# Patient Record
Sex: MALE | Race: WHITE
[De-identification: names, ages, dates, MRNs, and addresses within clinical notes are randomized per-mention and may not be internally consistent; named-entity substitution may affect disease eponyms.]

---

## 2019-01-15 ENCOUNTER — HOSPITAL ENCOUNTER (OUTPATIENT)
Dept: HOSPITAL 61 - SURG | Age: 84
Discharge: HOME | End: 2019-01-15
Attending: SURGERY
Payer: MEDICARE

## 2019-01-15 VITALS — DIASTOLIC BLOOD PRESSURE: 74 MMHG | SYSTOLIC BLOOD PRESSURE: 130 MMHG

## 2019-01-15 VITALS — HEIGHT: 71 IN | BODY MASS INDEX: 18.9 KG/M2 | WEIGHT: 135 LBS

## 2019-01-15 DIAGNOSIS — Z87.891: ICD-10-CM

## 2019-01-15 DIAGNOSIS — Z98.890: ICD-10-CM

## 2019-01-15 DIAGNOSIS — Z79.899: ICD-10-CM

## 2019-01-15 DIAGNOSIS — K21.9: ICD-10-CM

## 2019-01-15 DIAGNOSIS — F03.90: ICD-10-CM

## 2019-01-15 DIAGNOSIS — F41.9: ICD-10-CM

## 2019-01-15 DIAGNOSIS — K40.30: Primary | ICD-10-CM

## 2019-01-15 LAB
ALBUMIN SERPL-MCNC: 3.2 G/DL (ref 3.4–5)
ANION GAP SERPL CALC-SCNC: 10 MMOL/L (ref 6–14)
BASOPHILS # BLD AUTO: 0 X10^3/UL (ref 0–0.2)
BASOPHILS NFR BLD: 1 % (ref 0–3)
BUN SERPL-MCNC: 16 MG/DL (ref 8–26)
CALCIUM SERPL-MCNC: 9.4 MG/DL (ref 8.5–10.1)
CHLORIDE SERPL-SCNC: 102 MMOL/L (ref 98–107)
CO2 SERPL-SCNC: 25 MMOL/L (ref 21–32)
CREAT SERPL-MCNC: 1.5 MG/DL (ref 0.7–1.3)
EOSINOPHIL NFR BLD: 0.3 X10^3/UL (ref 0–0.7)
EOSINOPHIL NFR BLD: 8 % (ref 0–3)
ERYTHROCYTE [DISTWIDTH] IN BLOOD BY AUTOMATED COUNT: 12.9 % (ref 11.5–14.5)
GFR SERPLBLD BASED ON 1.73 SQ M-ARVRAT: 44.2 ML/MIN
GLUCOSE SERPL-MCNC: 104 MG/DL (ref 70–99)
HCT VFR BLD CALC: 40.3 % (ref 39–53)
HGB BLD-MCNC: 14.2 G/DL (ref 13–17.5)
LYMPHOCYTES # BLD: 1 X10^3/UL (ref 1–4.8)
LYMPHOCYTES NFR BLD AUTO: 21 % (ref 24–48)
MCH RBC QN AUTO: 34 PG (ref 25–35)
MCHC RBC AUTO-ENTMCNC: 35 G/DL (ref 31–37)
MCV RBC AUTO: 97 FL (ref 79–100)
MONO #: 0.5 X10^3/UL (ref 0–1.1)
MONOCYTES NFR BLD: 11 % (ref 0–9)
NEUT #: 2.8 X10^3UL (ref 1.8–7.7)
NEUTROPHILS NFR BLD AUTO: 60 % (ref 31–73)
PLATELET # BLD AUTO: 436 X10^3/UL (ref 140–400)
POTASSIUM SERPL-SCNC: 4 MMOL/L (ref 3.5–5.1)
RBC # BLD AUTO: 4.15 X10^6/UL (ref 4.3–5.7)
SODIUM SERPL-SCNC: 137 MMOL/L (ref 136–145)
WBC # BLD AUTO: 4.7 X10^3/UL (ref 4–11)

## 2019-01-15 PROCEDURE — 88307 TISSUE EXAM BY PATHOLOGIST: CPT

## 2019-01-15 PROCEDURE — 88302 TISSUE EXAM BY PATHOLOGIST: CPT

## 2019-01-15 PROCEDURE — 49507 PRP I/HERN INIT BLOCK >5 YR: CPT

## 2019-01-15 PROCEDURE — 80048 BASIC METABOLIC PNL TOTAL CA: CPT

## 2019-01-15 PROCEDURE — 36415 COLL VENOUS BLD VENIPUNCTURE: CPT

## 2019-01-15 PROCEDURE — 85025 COMPLETE CBC W/AUTO DIFF WBC: CPT

## 2019-01-15 PROCEDURE — A7015 AEROSOL MASK USED W NEBULIZE: HCPCS

## 2019-01-15 PROCEDURE — 82040 ASSAY OF SERUM ALBUMIN: CPT

## 2019-01-15 NOTE — PDOC
BRIEF OPERATIVE NOTE


Date:  Pee 15, 2019


Pre-Op Diagnosis


incarcerated recurrent left inguinal hernia


Post-Op Diagnosis


same, indirect


Procedure Performed


repair with mesh, partial omentectomy


Surgeon


Elpidio


Anesthesia Type:  General


Blood Loss


10cc


IV Fluid


500cc


Specimens Obtained


hernia sack


omentum


Findings


incarcerated omentum in an indirect hernia sack


Complications


none


Operative Note


WK # [id number]











KARLA GUERRA MD Pee 15, 2019 10:41

## 2019-01-15 NOTE — OP
DATE OF SURGERY:  01/15/2019



PREOPERATIVE DIAGNOSIS:  Incarcerated left inguinal hernia.



POSTOPERATIVE DIAGNOSIS:  Incarcerated left inguinal hernia, indirect.



PROCEDURE:

1.  Repair with mesh.

2.  Partial omentectomy.



SURGEON:  Karla Guerra MD.



ANESTHESIA:  General LMA.



ESTIMATED BLOOD LOSS:  10 mL.



IV FLUID:  500 mL.



INDICATIONS:  The patient is an 88-year-old extremely hard of hearing gentleman

with a large fullness in the left groin, brought in for repair.



OPERATIVE FINDINGS:  The large indirect hernia sac contained incarcerated

omentum, adequate inguinal floor present.



DESCRIPTION OF PROCEDURE:  The patient brought to the operating suite, given a

general LMA and the left groin was prepped and draped in usual sterile fashion. 

A 0.5% Marcaine with epinephrine was used to infiltrate the skin and

subcutaneous tissue along the incision line.  Incision made and dissection

carried down to the external oblique fascia.  Bleeders were cauterized as

identified.  The fascia was opened in direction of its fibers and carefully

freed from the underlying structures.  This allowed exposure of the hernia,

which was then reduced out of the scrotum up into the wound.  The cord was freed

off the hernia sac and a Penrose drain placed around it.  Attempts reducing the

contents were unsuccessful and as such, the hernia sac was opened and the

incarcerated omental contents were resected by serial clamping, dividing and

ligating with 0 Vicryl tie.  This allowed reduction of the remaining stump of

omentum back into the abdomen.  The hernia sac was closed with a stick tie 0

Vicryl, taking care to avoid adjacent structures.  The excess sac was excised

and the stump retracted spontaneously into the abdominal cavity.  This was held

in reduction with a large plug of a Phasix mesh, tacked with 2-0 PDS, taking

care to avoid injury to adjacent vessels.



A keyhole patch was fashioned and placed over the floor of the canal.  The slit

closed with a single 2-0 PDS stitch.  The cord returned to its normal anatomical

position and the external oblique fascia closed over in a running fashion with

3-0 Vicryl.  Subcutaneous approximated with 3-0 Vicryl, skin closed with a

subcuticular 4-0 Monocryl.  Steri-Strips and sterile dressing applied.



Prior to emergence from anesthesia, digital rectal exam failed to reveal

evidence of prostatic enlargement or nodularity.  The patient was awakened from

his anesthetic and taken to the recovery room in satisfactory condition.

 



______________________________

KARLA GUERRA MD



DR:  LUCILLE/willa  JOB#:  [id number] / [id number]

DD:  01/15/2019 10:39  DT:  01/15/2019 10:50

## 2019-01-15 NOTE — DISCH
DISCHARGE INSTRUCTIONS


Condition on Discharge


Condition on Discharge:  Stable





Activity After Discharge


Activity Instructions for Disc:  Activity as tolerated, Avoid exertion


Lifting Instructions after Dis:  No heavy lifting


Driving Instructions after Dis:  Do not drive





Diet after Discharge


Diet after Discharge:  Regular





Wound Incision Care


Wound/Incision Care:  Ice to area for comfort


Other wound/incision instructi:  may shower Thursday





Follow-Up


Follow up with:  Elpidio 1/25











KARLA GUERRA MD Pee 15, 2019 10:43

## 2019-01-16 NOTE — PATHOLOGY
[facility] Accession Number: [id number]

.                                                                01

Material submitted:                                        .

PART A: HERNIA SAC

PART B: OMENTUM

.                                                                01

Clinical history:                                          .

Recurrent left inguinal hernia

.                                                                02

**********************************************************************

Diagnosis:

A.  Segment of focal mesothelial-lined fibromembranous and fibroadipose

tissue, left inguinal hernia repair:

- Hernia sac.

.

B.  Segments of adipose tissue, omentum:

- Congestion and focal recent hemorrhage.

.

(JPM:claudio; 01/16/2019)

MBR/01/16/2019

**********************************************************************

.                                                                02

Electronically signed:                                     .

Popeye Hernandes MD, Pathologist

NPI- [id number]

.                                                                01

Gross description:                                         .

A. The specimen is received in formalin, labeled "Zoran, Stovall, hernia

sac" and consists of a segment of pink-gray membranous tissue with

attached yellow lobulated soft tissue measuring 10.3 x 3.4 x 1.1 cm.

Sectioning reveals no nodules or mass lesions.  Representative sections

are submitted in A1.

.

B. The specimen is received in formalin, labeled "Zoran, Stovall,

omentum" and consists of 2 segments of omentum measuring 25.0 x 19.9 x 2.5

cm.  Sectioning reveals no nodules or mass lesions and representative

sections are submitted in A1-A2.

(SDY; 1/15/2019)

SYU/SYU

.                                                                02

Pathologist provided ICD-10:

K40.90, K66.1

.                                                                02

CPT                                                        .

804927, [id number]

Specimen Comment: A courtesy copy of this report has been sent to

Specimen Comment: [phone], [phone].

Specimen Comment: Report sent to  / DR FITCH

***Performed at:  [address] [place] [address] [facility] [address], [place], [place]  [id number]

   MD Ulises Field MD Phone:  [phone]

***Performed at:  02

   [address]  [id number]

   MD Popeye Hernandes MD Phone:  [phone]